# Patient Record
(demographics unavailable — no encounter records)

---

## 2024-10-11 NOTE — PHYSICAL EXAM
[EOMI] : grossly EOMI [Discharge] : discharge [Bilateral] : (bilateral) [Scrapable White Plaques] : scrapable white plaques [NL] : warm, clear [Erythematous Oropharynx] : nonerythematous oropharynx [FreeTextEntry5] : yellow-green discharge from eyes [de-identified] : white plaques on tongue and inner cheeks

## 2024-10-11 NOTE — REVIEW OF SYSTEMS
[Negative] : Genitourinary [Eye Discharge] : no eye discharge [FreeTextEntry1] : white plaques in mouth

## 2024-10-11 NOTE — DISCUSSION/SUMMARY
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 Side effect of agent includes, but not limited to, nausea. Sterilize bottles and nipples. If no improvement or resolution of symptoms return to office.